# Patient Record
Sex: FEMALE | Race: WHITE | HISPANIC OR LATINO | Employment: UNEMPLOYED | URBAN - METROPOLITAN AREA
[De-identification: names, ages, dates, MRNs, and addresses within clinical notes are randomized per-mention and may not be internally consistent; named-entity substitution may affect disease eponyms.]

---

## 2017-06-09 ENCOUNTER — OFFICE VISIT (OUTPATIENT)
Dept: URGENT CARE | Facility: PHYSICIAN GROUP | Age: 62
End: 2017-06-09
Payer: COMMERCIAL

## 2017-06-09 VITALS
HEART RATE: 128 BPM | BODY MASS INDEX: 31.89 KG/M2 | WEIGHT: 180 LBS | OXYGEN SATURATION: 94 % | SYSTOLIC BLOOD PRESSURE: 140 MMHG | TEMPERATURE: 98.8 F | DIASTOLIC BLOOD PRESSURE: 84 MMHG | HEIGHT: 63 IN

## 2017-06-09 DIAGNOSIS — J02.0 ACUTE STREPTOCOCCAL PHARYNGITIS: ICD-10-CM

## 2017-06-09 LAB
INT CON NEG: NEGATIVE
INT CON POS: POSITIVE
S PYO AG THROAT QL: NORMAL

## 2017-06-09 PROCEDURE — 87880 STREP A ASSAY W/OPTIC: CPT | Performed by: FAMILY MEDICINE

## 2017-06-09 PROCEDURE — 99204 OFFICE O/P NEW MOD 45 MIN: CPT | Performed by: FAMILY MEDICINE

## 2017-06-09 RX ORDER — CEFTRIAXONE 1 G/1
1 INJECTION, POWDER, FOR SOLUTION INTRAMUSCULAR; INTRAVENOUS ONCE
Status: COMPLETED | OUTPATIENT
Start: 2017-06-09 | End: 2017-06-09

## 2017-06-09 RX ORDER — AMOXICILLIN 400 MG/5ML
POWDER, FOR SUSPENSION ORAL
Qty: 150 ML | Refills: 0 | Status: SHIPPED | OUTPATIENT
Start: 2017-06-09 | End: 2022-06-15

## 2017-06-09 RX ADMIN — CEFTRIAXONE 1 G: 1 INJECTION, POWDER, FOR SOLUTION INTRAMUSCULAR; INTRAVENOUS at 17:47

## 2017-06-10 NOTE — PROGRESS NOTES
Chief Complaint:    Chief Complaint   Patient presents with   • Cough     cough, fever        History of Present Illness:    Daughter present and helps translate. This is a new problem. Symptoms x 2 days. Has subjective fever, chills, body aches, right-sided sore throat (hurts to swallow, at least moderate severity), discomfort in right neck, and mild cough. No nasal symptoms. Has been taking Tylenol and Ibuprofen.      Review of Systems:    Constitutional: See HPI.   Eyes: Negative for change in vision, photophobia, pain, redness, and discharge.  ENT: See HPI.  Respiratory: See HPI.    Cardiovascular: Negative for chest pain, palpitations, orthopnea, claudication, leg swelling, and PND.   Gastrointestinal: Negative for abdominal pain, nausea, vomiting, diarrhea, constipation, blood in stool, and melena.   Genitourinary: Negative for dysuria, urinary urgency, urinary frequency, hematuria, and flank pain.   Musculoskeletal: See HPI.    Skin: Negative for rash and itching.   Neurological: Negative for dizziness, tingling, tremors, sensory change, speech change, focal weakness, seizures, loss of consciousness, and headaches.   Endo: Negative for polydipsia.   Heme: Does not bruise/bleed easily.   Psychiatric/Behavioral: Negative for depression, suicidal ideas, hallucinations, memory loss and substance abuse. The patient is not nervous/anxious and does not have insomnia.      Past Medical History:    Past Medical History   Diagnosis Date   • Cancer (CMS-HCC)    • Hypertension    • Psychiatric disorder      anxiety       Past Surgical History:    Past Surgical History   Procedure Laterality Date   • Cassandra by laparoscopy     • Other       tumor to chest       Social History:    Social History     Social History   • Marital Status:      Spouse Name: N/A   • Number of Children: N/A   • Years of Education: N/A     Occupational History   • Not on file.     Social History Main Topics   • Smoking status: Passive Smoke  "Exposure - Never Smoker   • Smokeless tobacco: Not on file   • Alcohol Use: No   • Drug Use: No   • Sexual Activity: Not on file     Other Topics Concern   • Not on file     Social History Narrative       Family History:    History reviewed. No pertinent family history.    Medications:    No current outpatient prescriptions on file prior to visit.     No current facility-administered medications on file prior to visit.       Allergies:    No Known Allergies      Vitals:    Filed Vitals:    06/09/17 1729   BP: 140/84   Pulse: 128   Temp: 37.1 °C (98.8 °F)   Height: 1.6 m (5' 2.99\")   Weight: 81.647 kg (180 lb)   SpO2: 94%       Physical Exam:    Constitutional: Vital signs reviewed. Appears well-developed and well-nourished. No acute distress.   Eyes: Sclera white, conjunctivae clear.   ENT: External ears normal. External auditory canals normal without discharge. TMs translucent and non-bulging. Hearing normal. Nasal mucosa pink. Lips/teeth are normal. Oral mucosa pink and moist. Posterior pharynx and tonsils: Moderately swollen and erythematous with mild exudate on right compared to left.  Neck: Neck supple.   Cardiovascular: Tachycardic. Regular rhythm. No murmur.   Pulmonary/Chest: Respirations non-labored. Clear to auscultation bilaterally.  Lymph: Right anterior cervical nodes are moderately tender and enlarged to palpation compared to left.  Musculoskeletal: Normal gait. Normal range of motion. No muscular atrophy or weakness.  Neurological: Alert and oriented to person, place, and time. Muscle tone normal. Coordination normal. Light touch and sensation normal.   Skin: No rashes or lesions. Warm, dry, normal turgor.  Psychiatric: Normal mood and affect. Behavior is normal. Judgment and thought content normal.     Diagnostics:     POCT RAPID STREP A (Order #[id number]) on 6/9/17       Component Results      Component     Rapid Strep Screen     Pos     Internal Control Positive     Positive     Internal Control " Negative     Negative         Last Resulted Time     Fri Jun 9, 2017  5:52 PM       Assessment / Plan:    1. Acute streptococcal pharyngitis  - POCT Rapid Strep A  - cefTRIAXone (ROCEPHIN) injection 1 g; 1,000 mg by Intramuscular route Once.  - amoxicillin (AMOXIL) 400 MG/5ML suspension; 7 ML TWICE A DAY X 10 DAYS.  Dispense: 150 mL; Refill: 0      Discussed with them DDX and management options.    Desires aggressive antibiotic treatment with IM and p.o. due to severity of symptoms.    Agreeable to medications given and prescribed.    May continue with OTC Tylenol and/or Ibuprofen prn fever, body aches, and/or sore throat.    Follow-up with PCP or urgent care if getting worse or not better with above.

## 2019-10-24 ENCOUNTER — HOSPITAL ENCOUNTER (OUTPATIENT)
Dept: LAB | Facility: MEDICAL CENTER | Age: 64
End: 2019-10-24
Attending: SPECIALIST
Payer: MEDICARE

## 2019-10-24 PROCEDURE — 88175 CYTOPATH C/V AUTO FLUID REDO: CPT

## 2019-10-24 PROCEDURE — 87624 HPV HI-RISK TYP POOLED RSLT: CPT

## 2019-10-29 LAB
AMBIGUOUS DTTM AMBI4: NORMAL
SPECIMEN SOURCE: NORMAL
SPECIMEN SOURCE: NORMAL

## 2019-10-30 LAB
CYTOLOGY REG CYTOL: NORMAL
HPV HR 12 DNA CVX QL NAA+PROBE: NEGATIVE
HPV16 DNA SPEC QL NAA+PROBE: NEGATIVE
HPV18 DNA SPEC QL NAA+PROBE: NEGATIVE
SPECIMEN SOURCE: NORMAL

## 2021-03-03 DIAGNOSIS — Z23 NEED FOR VACCINATION: ICD-10-CM

## 2022-06-15 ENCOUNTER — APPOINTMENT (OUTPATIENT)
Dept: RADIOLOGY | Facility: MEDICAL CENTER | Age: 67
End: 2022-06-15
Attending: EMERGENCY MEDICINE
Payer: MEDICARE

## 2022-06-15 ENCOUNTER — HOSPITAL ENCOUNTER (OUTPATIENT)
Facility: MEDICAL CENTER | Age: 67
End: 2022-06-16
Attending: EMERGENCY MEDICINE | Admitting: STUDENT IN AN ORGANIZED HEALTH CARE EDUCATION/TRAINING PROGRAM
Payer: MEDICARE

## 2022-06-15 DIAGNOSIS — R07.9 ACUTE CHEST PAIN: ICD-10-CM

## 2022-06-15 DIAGNOSIS — R94.31 ABNORMAL EKG: ICD-10-CM

## 2022-06-15 DIAGNOSIS — R07.2 PRECORDIAL PAIN: ICD-10-CM

## 2022-06-15 PROBLEM — E66.9 CLASS 1 OBESITY IN ADULT: Status: ACTIVE | Noted: 2022-06-15

## 2022-06-15 PROBLEM — I10 HYPERTENSION: Status: ACTIVE | Noted: 2022-06-15

## 2022-06-15 PROBLEM — R79.89 ELEVATED D-DIMER: Status: ACTIVE | Noted: 2022-06-15

## 2022-06-15 PROBLEM — K21.9 GERD (GASTROESOPHAGEAL REFLUX DISEASE): Status: ACTIVE | Noted: 2022-06-15

## 2022-06-15 PROBLEM — Z71.89 ACP (ADVANCE CARE PLANNING): Status: ACTIVE | Noted: 2022-06-15

## 2022-06-15 LAB
ALBUMIN SERPL BCP-MCNC: 4.2 G/DL (ref 3.2–4.9)
ALBUMIN/GLOB SERPL: 1.4 G/DL
ALP SERPL-CCNC: 94 U/L (ref 30–99)
ALT SERPL-CCNC: 25 U/L (ref 2–50)
ANION GAP SERPL CALC-SCNC: 10 MMOL/L (ref 7–16)
AST SERPL-CCNC: 21 U/L (ref 12–45)
BASOPHILS # BLD AUTO: 0.7 % (ref 0–1.8)
BASOPHILS # BLD: 0.05 K/UL (ref 0–0.12)
BILIRUB SERPL-MCNC: 0.4 MG/DL (ref 0.1–1.5)
BUN SERPL-MCNC: 17 MG/DL (ref 8–22)
CALCIUM SERPL-MCNC: 9.4 MG/DL (ref 8.5–10.5)
CHLORIDE SERPL-SCNC: 106 MMOL/L (ref 96–112)
CO2 SERPL-SCNC: 24 MMOL/L (ref 20–33)
CREAT SERPL-MCNC: 0.81 MG/DL (ref 0.5–1.4)
D DIMER PPP IA.FEU-MCNC: 0.78 UG/ML (FEU) (ref 0–0.5)
EKG IMPRESSION: NORMAL
EOSINOPHIL # BLD AUTO: 0.18 K/UL (ref 0–0.51)
EOSINOPHIL NFR BLD: 2.5 % (ref 0–6.9)
ERYTHROCYTE [DISTWIDTH] IN BLOOD BY AUTOMATED COUNT: 43.5 FL (ref 35.9–50)
GFR SERPLBLD CREATININE-BSD FMLA CKD-EPI: 79 ML/MIN/1.73 M 2
GLOBULIN SER CALC-MCNC: 3 G/DL (ref 1.9–3.5)
GLUCOSE SERPL-MCNC: 102 MG/DL (ref 65–99)
HCT VFR BLD AUTO: 41.7 % (ref 37–47)
HGB BLD-MCNC: 14 G/DL (ref 12–16)
IMM GRANULOCYTES # BLD AUTO: 0.04 K/UL (ref 0–0.11)
IMM GRANULOCYTES NFR BLD AUTO: 0.6 % (ref 0–0.9)
LYMPHOCYTES # BLD AUTO: 2.56 K/UL (ref 1–4.8)
LYMPHOCYTES NFR BLD: 36 % (ref 22–41)
MCH RBC QN AUTO: 30.1 PG (ref 27–33)
MCHC RBC AUTO-ENTMCNC: 33.6 G/DL (ref 33.6–35)
MCV RBC AUTO: 89.7 FL (ref 81.4–97.8)
MONOCYTES # BLD AUTO: 0.55 K/UL (ref 0–0.85)
MONOCYTES NFR BLD AUTO: 7.7 % (ref 0–13.4)
NEUTROPHILS # BLD AUTO: 3.73 K/UL (ref 2–7.15)
NEUTROPHILS NFR BLD: 52.5 % (ref 44–72)
NRBC # BLD AUTO: 0 K/UL
NRBC BLD-RTO: 0 /100 WBC
NT-PROBNP SERPL IA-MCNC: 290 PG/ML (ref 0–125)
PLATELET # BLD AUTO: 238 K/UL (ref 164–446)
PMV BLD AUTO: 10.2 FL (ref 9–12.9)
POTASSIUM SERPL-SCNC: 3.9 MMOL/L (ref 3.6–5.5)
PROT SERPL-MCNC: 7.2 G/DL (ref 6–8.2)
RBC # BLD AUTO: 4.65 M/UL (ref 4.2–5.4)
SODIUM SERPL-SCNC: 140 MMOL/L (ref 135–145)
TROPONIN T SERPL-MCNC: <6 NG/L (ref 6–19)
WBC # BLD AUTO: 7.1 K/UL (ref 4.8–10.8)

## 2022-06-15 PROCEDURE — 84484 ASSAY OF TROPONIN QUANT: CPT

## 2022-06-15 PROCEDURE — 83880 ASSAY OF NATRIURETIC PEPTIDE: CPT

## 2022-06-15 PROCEDURE — A9270 NON-COVERED ITEM OR SERVICE: HCPCS | Performed by: EMERGENCY MEDICINE

## 2022-06-15 PROCEDURE — 700102 HCHG RX REV CODE 250 W/ 637 OVERRIDE(OP): Performed by: EMERGENCY MEDICINE

## 2022-06-15 PROCEDURE — 99285 EMERGENCY DEPT VISIT HI MDM: CPT

## 2022-06-15 PROCEDURE — 71045 X-RAY EXAM CHEST 1 VIEW: CPT

## 2022-06-15 PROCEDURE — 96376 TX/PRO/DX INJ SAME DRUG ADON: CPT | Mod: XU

## 2022-06-15 PROCEDURE — 700102 HCHG RX REV CODE 250 W/ 637 OVERRIDE(OP): Performed by: STUDENT IN AN ORGANIZED HEALTH CARE EDUCATION/TRAINING PROGRAM

## 2022-06-15 PROCEDURE — 85025 COMPLETE CBC W/AUTO DIFF WBC: CPT

## 2022-06-15 PROCEDURE — G0378 HOSPITAL OBSERVATION PER HR: HCPCS

## 2022-06-15 PROCEDURE — 700111 HCHG RX REV CODE 636 W/ 250 OVERRIDE (IP): Performed by: EMERGENCY MEDICINE

## 2022-06-15 PROCEDURE — 85379 FIBRIN DEGRADATION QUANT: CPT

## 2022-06-15 PROCEDURE — 93005 ELECTROCARDIOGRAM TRACING: CPT | Performed by: EMERGENCY MEDICINE

## 2022-06-15 PROCEDURE — 80053 COMPREHEN METABOLIC PANEL: CPT

## 2022-06-15 PROCEDURE — 93005 ELECTROCARDIOGRAM TRACING: CPT

## 2022-06-15 PROCEDURE — 71275 CT ANGIOGRAPHY CHEST: CPT | Mod: ME

## 2022-06-15 PROCEDURE — 700111 HCHG RX REV CODE 636 W/ 250 OVERRIDE (IP): Performed by: STUDENT IN AN ORGANIZED HEALTH CARE EDUCATION/TRAINING PROGRAM

## 2022-06-15 PROCEDURE — 36415 COLL VENOUS BLD VENIPUNCTURE: CPT

## 2022-06-15 PROCEDURE — 96375 TX/PRO/DX INJ NEW DRUG ADDON: CPT | Mod: XU

## 2022-06-15 PROCEDURE — 700117 HCHG RX CONTRAST REV CODE 255: Performed by: EMERGENCY MEDICINE

## 2022-06-15 PROCEDURE — 96374 THER/PROPH/DIAG INJ IV PUSH: CPT | Mod: XU

## 2022-06-15 PROCEDURE — A9270 NON-COVERED ITEM OR SERVICE: HCPCS | Performed by: STUDENT IN AN ORGANIZED HEALTH CARE EDUCATION/TRAINING PROGRAM

## 2022-06-15 PROCEDURE — 99218 PR INITIAL OBSERVATION CARE,LEVL I: CPT | Performed by: STUDENT IN AN ORGANIZED HEALTH CARE EDUCATION/TRAINING PROGRAM

## 2022-06-15 PROCEDURE — 96372 THER/PROPH/DIAG INJ SC/IM: CPT | Mod: XU

## 2022-06-15 RX ORDER — POLYETHYLENE GLYCOL 3350 17 G/17G
1 POWDER, FOR SOLUTION ORAL
Status: DISCONTINUED | OUTPATIENT
Start: 2022-06-15 | End: 2022-06-17 | Stop reason: HOSPADM

## 2022-06-15 RX ORDER — ENOXAPARIN SODIUM 100 MG/ML
40 INJECTION SUBCUTANEOUS DAILY
Status: DISCONTINUED | OUTPATIENT
Start: 2022-06-15 | End: 2022-06-17 | Stop reason: HOSPADM

## 2022-06-15 RX ORDER — LANSOPRAZOLE 30 MG/1
30 CAPSULE, DELAYED RELEASE ORAL EVERY MORNING
COMMUNITY

## 2022-06-15 RX ORDER — BISOPROLOL FUMARATE 5 MG/1
5 TABLET, FILM COATED ORAL EVERY MORNING
COMMUNITY

## 2022-06-15 RX ORDER — LORAZEPAM 2 MG/ML
0.5 INJECTION INTRAMUSCULAR ONCE
Status: COMPLETED | OUTPATIENT
Start: 2022-06-15 | End: 2022-06-15

## 2022-06-15 RX ORDER — HYDRALAZINE HYDROCHLORIDE 20 MG/ML
10 INJECTION INTRAMUSCULAR; INTRAVENOUS EVERY 4 HOURS PRN
Status: DISCONTINUED | OUTPATIENT
Start: 2022-06-15 | End: 2022-06-17 | Stop reason: HOSPADM

## 2022-06-15 RX ORDER — ONDANSETRON 2 MG/ML
4 INJECTION INTRAMUSCULAR; INTRAVENOUS ONCE
Status: COMPLETED | OUTPATIENT
Start: 2022-06-15 | End: 2022-06-15

## 2022-06-15 RX ORDER — BISACODYL 10 MG
10 SUPPOSITORY, RECTAL RECTAL
Status: DISCONTINUED | OUTPATIENT
Start: 2022-06-15 | End: 2022-06-17 | Stop reason: HOSPADM

## 2022-06-15 RX ORDER — OMEPRAZOLE 20 MG/1
20 CAPSULE, DELAYED RELEASE ORAL DAILY
Status: DISCONTINUED | OUTPATIENT
Start: 2022-06-16 | End: 2022-06-17 | Stop reason: HOSPADM

## 2022-06-15 RX ORDER — LANSOPRAZOLE 30 MG/1
30 CAPSULE, DELAYED RELEASE ORAL EVERY MORNING
Status: DISCONTINUED | OUTPATIENT
Start: 2022-06-16 | End: 2022-06-15

## 2022-06-15 RX ORDER — ATORVASTATIN CALCIUM 40 MG/1
40 TABLET, FILM COATED ORAL EVERY EVENING
Status: DISCONTINUED | OUTPATIENT
Start: 2022-06-15 | End: 2022-06-17 | Stop reason: HOSPADM

## 2022-06-15 RX ORDER — NITROGLYCERIN 0.4 MG/1
0.4 TABLET SUBLINGUAL
Status: DISCONTINUED | OUTPATIENT
Start: 2022-06-15 | End: 2022-06-16

## 2022-06-15 RX ORDER — MORPHINE SULFATE 4 MG/ML
4 INJECTION INTRAVENOUS ONCE
Status: COMPLETED | OUTPATIENT
Start: 2022-06-15 | End: 2022-06-15

## 2022-06-15 RX ORDER — AMLODIPINE BESYLATE 5 MG/1
5 TABLET ORAL EVERY EVENING
Status: DISCONTINUED | OUTPATIENT
Start: 2022-06-15 | End: 2022-06-17 | Stop reason: HOSPADM

## 2022-06-15 RX ORDER — GUAIFENESIN/DEXTROMETHORPHAN 100-10MG/5
10 SYRUP ORAL EVERY 6 HOURS PRN
Status: DISCONTINUED | OUTPATIENT
Start: 2022-06-15 | End: 2022-06-17 | Stop reason: HOSPADM

## 2022-06-15 RX ORDER — AMLODIPINE BESYLATE 10 MG/1
5 TABLET ORAL EVERY EVENING
COMMUNITY

## 2022-06-15 RX ORDER — MORPHINE SULFATE 4 MG/ML
2-4 INJECTION INTRAVENOUS
Status: DISCONTINUED | OUTPATIENT
Start: 2022-06-15 | End: 2022-06-17 | Stop reason: HOSPADM

## 2022-06-15 RX ORDER — ONDANSETRON 4 MG/1
4 TABLET, ORALLY DISINTEGRATING ORAL EVERY 4 HOURS PRN
Status: DISCONTINUED | OUTPATIENT
Start: 2022-06-15 | End: 2022-06-17 | Stop reason: HOSPADM

## 2022-06-15 RX ORDER — ONDANSETRON 2 MG/ML
4 INJECTION INTRAMUSCULAR; INTRAVENOUS EVERY 4 HOURS PRN
Status: DISCONTINUED | OUTPATIENT
Start: 2022-06-15 | End: 2022-06-17 | Stop reason: HOSPADM

## 2022-06-15 RX ORDER — AMOXICILLIN 250 MG
2 CAPSULE ORAL 2 TIMES DAILY
Status: DISCONTINUED | OUTPATIENT
Start: 2022-06-15 | End: 2022-06-17 | Stop reason: HOSPADM

## 2022-06-15 RX ORDER — ACETAMINOPHEN 500 MG
1000 TABLET ORAL EVERY 6 HOURS PRN
COMMUNITY

## 2022-06-15 RX ORDER — ACETAMINOPHEN 325 MG/1
650 TABLET ORAL EVERY 6 HOURS PRN
Status: DISCONTINUED | OUTPATIENT
Start: 2022-06-15 | End: 2022-06-17 | Stop reason: HOSPADM

## 2022-06-15 RX ADMIN — ONDANSETRON HYDROCHLORIDE 4 MG: 2 SOLUTION INTRAMUSCULAR; INTRAVENOUS at 16:36

## 2022-06-15 RX ADMIN — LORAZEPAM 0.5 MG: 2 INJECTION INTRAMUSCULAR; INTRAVENOUS at 20:18

## 2022-06-15 RX ADMIN — DOCUSATE SODIUM 50 MG AND SENNOSIDES 8.6 MG 2 TABLET: 8.6; 5 TABLET, FILM COATED ORAL at 20:00

## 2022-06-15 RX ADMIN — AMLODIPINE BESYLATE 5 MG: 5 TABLET ORAL at 20:00

## 2022-06-15 RX ADMIN — IOHEXOL 40 ML: 350 INJECTION, SOLUTION INTRAVENOUS at 21:20

## 2022-06-15 RX ADMIN — ENOXAPARIN SODIUM 40 MG: 40 INJECTION SUBCUTANEOUS at 19:59

## 2022-06-15 RX ADMIN — LIDOCAINE HYDROCHLORIDE 30 ML: 20 SOLUTION OROPHARYNGEAL at 16:36

## 2022-06-15 RX ADMIN — ASPIRIN 324 MG: 81 TABLET, COATED ORAL at 19:59

## 2022-06-15 RX ADMIN — MORPHINE SULFATE 4 MG: 4 INJECTION INTRAVENOUS at 16:37

## 2022-06-15 RX ADMIN — MORPHINE SULFATE 2 MG: 4 INJECTION INTRAVENOUS at 21:54

## 2022-06-15 RX ADMIN — ATORVASTATIN CALCIUM 40 MG: 40 TABLET, FILM COATED ORAL at 20:00

## 2022-06-15 ASSESSMENT — PATIENT HEALTH QUESTIONNAIRE - PHQ9
2. FEELING DOWN, DEPRESSED, IRRITABLE, OR HOPELESS: SEVERAL DAYS
4. FEELING TIRED OR HAVING LITTLE ENERGY: NOT AT ALL
7. TROUBLE CONCENTRATING ON THINGS, SUCH AS READING THE NEWSPAPER OR WATCHING TELEVISION: NOT AT ALL
5. POOR APPETITE OR OVEREATING: NOT AT ALL
SUM OF ALL RESPONSES TO PHQ9 QUESTIONS 1 AND 2: 2
SUM OF ALL RESPONSES TO PHQ QUESTIONS 1-9: 2
8. MOVING OR SPEAKING SO SLOWLY THAT OTHER PEOPLE COULD HAVE NOTICED. OR THE OPPOSITE, BEING SO FIGETY OR RESTLESS THAT YOU HAVE BEEN MOVING AROUND A LOT MORE THAN USUAL: NOT AT ALL
1. LITTLE INTEREST OR PLEASURE IN DOING THINGS: SEVERAL DAYS
9. THOUGHTS THAT YOU WOULD BE BETTER OFF DEAD, OR OF HURTING YOURSELF: NOT AT ALL
6. FEELING BAD ABOUT YOURSELF - OR THAT YOU ARE A FAILURE OR HAVE LET YOURSELF OR YOUR FAMILY DOWN: NOT AL ALL
3. TROUBLE FALLING OR STAYING ASLEEP OR SLEEPING TOO MUCH: NOT AT ALL

## 2022-06-15 ASSESSMENT — ENCOUNTER SYMPTOMS
DEPRESSION: 0
FEVER: 0
BLURRED VISION: 0
COUGH: 0
NAUSEA: 0
DOUBLE VISION: 0
SPEECH CHANGE: 0
VOMITING: 0
HEARTBURN: 0
HEADACHES: 0
ABDOMINAL PAIN: 0
FLANK PAIN: 0
DIZZINESS: 0
CHILLS: 0
MYALGIAS: 1
PALPITATIONS: 0
BRUISES/BLEEDS EASILY: 0
ORTHOPNEA: 0
SHORTNESS OF BREATH: 0
FOCAL WEAKNESS: 0

## 2022-06-15 ASSESSMENT — PAIN DESCRIPTION - PAIN TYPE: TYPE: ACUTE PAIN

## 2022-06-15 ASSESSMENT — LIFESTYLE VARIABLES
ON A TYPICAL DAY WHEN YOU DRINK ALCOHOL HOW MANY DRINKS DO YOU HAVE: 0
TOTAL SCORE: 0
DO YOU DRINK ALCOHOL: NO
CONSUMPTION TOTAL: NEGATIVE
AVERAGE NUMBER OF DAYS PER WEEK YOU HAVE A DRINK CONTAINING ALCOHOL: 0
TOTAL SCORE: 0
EVER FELT BAD OR GUILTY ABOUT YOUR DRINKING: NO
HOW MANY TIMES IN THE PAST YEAR HAVE YOU HAD 5 OR MORE DRINKS IN A DAY: 0
SUBSTANCE_ABUSE: 0
TOTAL SCORE: 0
EVER HAD A DRINK FIRST THING IN THE MORNING TO STEADY YOUR NERVES TO GET RID OF A HANGOVER: NO
HAVE PEOPLE ANNOYED YOU BY CRITICIZING YOUR DRINKING: NO
HAVE YOU EVER FELT YOU SHOULD CUT DOWN ON YOUR DRINKING: NO
ALCOHOL_USE: NO
DOES PATIENT WANT TO STOP DRINKING: NO

## 2022-06-15 ASSESSMENT — FIBROSIS 4 INDEX: FIB4 SCORE: 1.2

## 2022-06-15 NOTE — ED TRIAGE NOTES
"Chief Complaint   Patient presents with   • Shoulder Pain     Right shoulder pain   • Chest Pain     Right lung    sent from , denies injury.  Pain is worse when taking a deep breath. Pain is sharp in nature.   BP (!) 143/67   Pulse 68   Temp 36.8 °C (98.3 °F) (Temporal)   Resp 18   Ht 1.651 m (5' 5\")   Wt 89.5 kg (197 lb 5 oz)   SpO2 96%   Pt informed of wait times. Educated on triage process.  Asked to return to triage RN for any new or worsening of symptoms. Thanked for patience.        "

## 2022-06-15 NOTE — ED NOTES
Patient presents complaining of right chest and shoulder pain. MD Hockley at bedside. IV placed and labs sent

## 2022-06-15 NOTE — ED PROVIDER NOTES
ED Provider Note    CHIEF COMPLAINT  Chief Complaint   Patient presents with   • Shoulder Pain     Right shoulder pain   • Chest Pain     Right lung        HPI  HPI     67-year-old female with past medical history hypertension and GERD presents with right-sided chest pain and shoulder pain.  On Monday she reported having right-sided chest pain radiating to her shoulder or neck.  Patient denies any recent trauma or fevers.  Patient denies any recent change in medications.    Patient describes pain as worsening with deep inspiration.  Patient describes pain as sharp in nature.      REVIEW OF SYSTEMS  Review of Systems   Constitutional: Negative.  Negative for fever.   HENT: Negative.  Negative for ear pain and sore throat.    Eyes: Negative.  Negative for pain.   Respiratory: Negative.  Negative for shortness of breath.    Cardiovascular: Positive for chest pain.   Gastrointestinal: Negative.  Negative for abdominal pain and blood in stool.   Genitourinary: Negative for dysuria and flank pain.   Musculoskeletal: Negative for back pain, myalgias and neck pain.   Skin: Negative.  Negative for rash.   Neurological: Negative for focal weakness, seizures, weakness and headaches.   Endo/Heme/Allergies: Does not bruise/bleed easily.   Psychiatric/Behavioral: Negative for hallucinations and suicidal ideas.   All other systems reviewed and are negative.      PAST MEDICAL HISTORY   has a past medical history of Cancer (HCC), Hypertension, and Psychiatric disorder.    SOCIAL HISTORY  Social History     Tobacco Use   • Smoking status: Passive Smoke Exposure - Never Smoker   • Smokeless tobacco: Not on file   Substance and Sexual Activity   • Alcohol use: No   • Drug use: No   • Sexual activity: Not on file       SURGICAL HISTORY   has a past surgical history that includes erna by laparoscopy and other.    CURRENT MEDICATIONS  Home Medications     Reviewed by En Smith (Pharmacy Tech) on 06/15/22 at 2208  [facility] List  "Status: Complete   Medication Last Dose Status   acetaminophen (TYLENOL) 500 MG Tab 6/14/2022 Active   amLODIPine (NORVASC) 10 MG Tab 6/14/2022 Active   bisoprolol (ZEBETA) 5 MG Tab 6/15/2022 Active   lansoprazole (PREVACID) 30 MG CAPSULE DELAYED RELEASE 6/15/2022 Active   Non Formulary Request 6/14/2022 Active                ALLERGIES  No Known Allergies    PHYSICAL EXAM  VITAL SIGNS: /57   Pulse 79   Temp 36.2 °C (97.1 °F) (Temporal)   Resp 16   Ht 1.651 m (5' 5\")   Wt 83.6 kg (184 lb 4.9 oz)   SpO2 93%   BMI 30.67 kg/m²  @RHONDA[[id number]::@  Pulse ox interpretation: I interpret this pulse ox as normal.    Physical Exam  HENT:      Head: Normocephalic and atraumatic.      Right Ear: External ear normal.      Left Ear: External ear normal.   Eyes:      General: No scleral icterus.     Conjunctiva/sclera: Conjunctivae normal.   Cardiovascular:      Rate and Rhythm: Normal rate.   Pulmonary:      Effort: Pulmonary effort is normal. No respiratory distress.      Breath sounds: No stridor. No wheezing.   Abdominal:      General: There is no distension.      Tenderness: There is no abdominal tenderness.   Musculoskeletal:         General: No deformity. Normal range of motion.      Cervical back: Normal range of motion.   Skin:     General: Skin is warm and dry.      Findings: No erythema or rash.   Neurological:      Mental Status: She is alert and oriented to person, place, and time.      Coordination: Coordination normal.   Psychiatric:         Mood and Affect: Affect normal.         Judgment: Judgment normal.     2+ peripheral pulses and no lower extremity edema    DIAGNOSTIC STUDIES / PROCEDURES    LABS/EKG  Results for orders placed or performed during the hospital encounter of 06/15/22   CBC with Differential   Result Value Ref Range    WBC 7.1 4.8 - 10.8 K/uL    RBC 4.65 4.20 - 5.40 M/uL    Hemoglobin 14.0 12.0 - 16.0 g/dL    Hematocrit 41.7 37.0 - 47.0 %    MCV 89.7 81.4 - 97.8 fL    MCH 30.1 27.0 - 33.0 " pg    MCHC 33.6 33.6 - 35.0 g/dL    RDW 43.5 35.9 - 50.0 fL    Platelet Count 238 164 - 446 K/uL    MPV 10.2 9.0 - 12.9 fL    Neutrophils-Polys 52.50 44.00 - 72.00 %    Lymphocytes 36.00 22.00 - 41.00 %    Monocytes 7.70 0.00 - 13.40 %    Eosinophils 2.50 0.00 - 6.90 %    Basophils 0.70 0.00 - 1.80 %    Immature Granulocytes 0.60 0.00 - 0.90 %    Nucleated RBC 0.00 /100 WBC    Neutrophils (Absolute) 3.73 2.00 - 7.15 K/uL    Lymphs (Absolute) 2.56 1.00 - 4.80 K/uL    Monos (Absolute) 0.55 0.00 - 0.85 K/uL    Eos (Absolute) 0.18 0.00 - 0.51 K/uL    Baso (Absolute) 0.05 0.00 - 0.12 K/uL    Immature Granulocytes (abs) 0.04 0.00 - 0.11 K/uL    NRBC (Absolute) 0.00 K/uL   Complete Metabolic Panel (CMP)   Result Value Ref Range    Sodium 140 135 - 145 mmol/L    Potassium 3.9 3.6 - 5.5 mmol/L    Chloride 106 96 - 112 mmol/L    Co2 24 20 - 33 mmol/L    Anion Gap 10.0 7.0 - 16.0    Glucose 102 (H) 65 - 99 mg/dL    Bun 17 8 - 22 mg/dL    Creatinine 0.81 0.50 - 1.40 mg/dL    Calcium 9.4 8.5 - 10.5 mg/dL    AST(SGOT) 21 12 - 45 U/L    ALT(SGPT) 25 2 - 50 U/L    Alkaline Phosphatase 94 30 - 99 U/L    Total Bilirubin 0.4 0.1 - 1.5 mg/dL    Albumin 4.2 3.2 - 4.9 g/dL    Total Protein 7.2 6.0 - 8.2 g/dL    Globulin 3.0 1.9 - 3.5 g/dL    A-G Ratio 1.4 g/dL   Troponin   Result Value Ref Range    Troponin T <6 6 - 19 ng/L   ESTIMATED GFR   Result Value Ref Range    GFR (CKD-EPI) 79 >60 mL/min/1.73 m 2   D-DIMER   Result Value Ref Range    D-Dimer Screen 0.78 (H) 0.00 - 0.50 ug/mL (FEU)   TROPONIN   Result Value Ref Range    Troponin T <6 6 - 19 ng/L   TROPONIN   Result Value Ref Range    Troponin T <6 6 - 19 ng/L   proBrain Natriuretic Peptide, NT   Result Value Ref Range    NT-proBNP 290 (H) 0 - 125 pg/mL   EKG   Result Value Ref Range    Report       [facility]    Test Date:  2022-06-15  Pt Name:    KATIUSKA GASPAR          Department: ER  MRN:        [id number]                      Room:  Gender:      Female                       Technician: [id number]  :        [id number]                   Requested By:ER TRIAGE PROTOCOL  Order #:    [id number]                    Reading MD:    Measurements  Intervals                                Axis  Rate:       74                           P:          60  MN:         148                          QRS:        -52  QRSD:       130                          T:          -3  QT:         424  QTc:        471    Interpretive Statements  SINUS RHYTHM  RBBB AND LAFB  Compared to ECG 2010 10:23:28  Left anterior fascicular block now present  Right bundle-branch block now present  Sinus tachycardia no longer present  Myocardial infarct finding no longer present         RADIOLOGY  CT-CTA CHEST PULMONARY ARTERY W/ RECONS   Final Result         1.  No evidence of pulmonary embolism.   2.  Atherosclerosis.      Fleischner Society pulmonary nodule recommendations:   Nonapplicable      DX-CHEST-PORTABLE (1 VIEW)   Final Result      1.  There is no acute cardiopulmonary process.   2.  Hypoinflated exam similar to prior exam.      EC-ECHOCARDIOGRAM COMPLETE W/O CONT    (Results Pending)   NM-CARDIAC STRESS TEST    (Results Pending)        COURSE & MEDICAL DECISION MAKING  Pertinent Labs & Imaging studies reviewed by me. (See chart for details)  I verified that the patient was wearing a mask and I was wearing appropriate PPE every time I entered the room. The patient's mask was on the patient at all times during my encounter except for a brief view of the oropharynx.     67 y.o. female  p/w chest pain.     Differential diagnosis includes but is not limited to:    Heart score 4  EKG with new right bundle branch block and LAFB  4:54 PM D/w cardiology re:ekg plan for repeat trop and admission given heart score of 4  D-dimer noted to be elevated therefore CT PE ordered by me with no evidence of pulmonary embolism  Plan for admission and stress test  Patient admitted to Dr. Bryant in guarded  condition      FINAL IMPRESSION  Visit Diagnoses     ICD-10-CM   1. Abnormal EKG  R94.31   2. Acute chest pain  R07.9              Electronically signed by: Giorgi Lopez M.D., 6/15/2022 4:53 PM

## 2022-06-16 ENCOUNTER — APPOINTMENT (OUTPATIENT)
Dept: CARDIOLOGY | Facility: MEDICAL CENTER | Age: 67
End: 2022-06-16
Attending: PHYSICIAN ASSISTANT
Payer: MEDICARE

## 2022-06-16 ENCOUNTER — APPOINTMENT (OUTPATIENT)
Dept: CARDIOLOGY | Facility: MEDICAL CENTER | Age: 67
End: 2022-06-16
Attending: STUDENT IN AN ORGANIZED HEALTH CARE EDUCATION/TRAINING PROGRAM
Payer: MEDICARE

## 2022-06-16 ENCOUNTER — APPOINTMENT (OUTPATIENT)
Dept: RADIOLOGY | Facility: MEDICAL CENTER | Age: 67
End: 2022-06-16
Attending: STUDENT IN AN ORGANIZED HEALTH CARE EDUCATION/TRAINING PROGRAM
Payer: MEDICARE

## 2022-06-16 VITALS
BODY MASS INDEX: 30.71 KG/M2 | HEIGHT: 65 IN | RESPIRATION RATE: 16 BRPM | OXYGEN SATURATION: 91 % | HEART RATE: 73 BPM | SYSTOLIC BLOOD PRESSURE: 128 MMHG | WEIGHT: 184.3 LBS | TEMPERATURE: 97.3 F | DIASTOLIC BLOOD PRESSURE: 61 MMHG

## 2022-06-16 PROBLEM — R07.9 CHEST PAIN: Status: RESOLVED | Noted: 2022-06-15 | Resolved: 2022-06-16

## 2022-06-16 PROBLEM — R79.89 ELEVATED D-DIMER: Status: RESOLVED | Noted: 2022-06-15 | Resolved: 2022-06-16

## 2022-06-16 LAB
ALBUMIN SERPL BCP-MCNC: 3.7 G/DL (ref 3.2–4.9)
BASOPHILS # BLD AUTO: 0.7 % (ref 0–1.8)
BASOPHILS # BLD: 0.05 K/UL (ref 0–0.12)
BUN SERPL-MCNC: 18 MG/DL (ref 8–22)
CALCIUM SERPL-MCNC: 9.1 MG/DL (ref 8.5–10.5)
CHLORIDE SERPL-SCNC: 105 MMOL/L (ref 96–112)
CHOLEST SERPL-MCNC: 158 MG/DL (ref 100–199)
CO2 SERPL-SCNC: 26 MMOL/L (ref 20–33)
CREAT SERPL-MCNC: 0.76 MG/DL (ref 0.5–1.4)
EKG IMPRESSION: NORMAL
EOSINOPHIL # BLD AUTO: 0.23 K/UL (ref 0–0.51)
EOSINOPHIL NFR BLD: 3.1 % (ref 0–6.9)
ERYTHROCYTE [DISTWIDTH] IN BLOOD BY AUTOMATED COUNT: 45.1 FL (ref 35.9–50)
EST. AVERAGE GLUCOSE BLD GHB EST-MCNC: 111 MG/DL
GFR SERPLBLD CREATININE-BSD FMLA CKD-EPI: 85 ML/MIN/1.73 M 2
GLUCOSE SERPL-MCNC: 98 MG/DL (ref 65–99)
HBA1C MFR BLD: 5.5 % (ref 4–5.6)
HCT VFR BLD AUTO: 39.6 % (ref 37–47)
HDLC SERPL-MCNC: 37 MG/DL
HGB BLD-MCNC: 13.1 G/DL (ref 12–16)
IMM GRANULOCYTES # BLD AUTO: 0.04 K/UL (ref 0–0.11)
IMM GRANULOCYTES NFR BLD AUTO: 0.5 % (ref 0–0.9)
LDLC SERPL CALC-MCNC: 58 MG/DL
LV EJECT FRACT  99904: 60
LV EJECT FRACT MOD 2C 99903: 67.34
LV EJECT FRACT MOD 4C 99902: 76.38
LV EJECT FRACT MOD BP 99901: 71.24
LYMPHOCYTES # BLD AUTO: 3.11 K/UL (ref 1–4.8)
LYMPHOCYTES NFR BLD: 42 % (ref 22–41)
MAGNESIUM SERPL-MCNC: 2.2 MG/DL (ref 1.5–2.5)
MCH RBC QN AUTO: 30.2 PG (ref 27–33)
MCHC RBC AUTO-ENTMCNC: 33.1 G/DL (ref 33.6–35)
MCV RBC AUTO: 91.2 FL (ref 81.4–97.8)
MONOCYTES # BLD AUTO: 0.51 K/UL (ref 0–0.85)
MONOCYTES NFR BLD AUTO: 6.9 % (ref 0–13.4)
NEUTROPHILS # BLD AUTO: 3.47 K/UL (ref 2–7.15)
NEUTROPHILS NFR BLD: 46.8 % (ref 44–72)
NRBC # BLD AUTO: 0 K/UL
NRBC BLD-RTO: 0 /100 WBC
PHOSPHATE SERPL-MCNC: 3.8 MG/DL (ref 2.5–4.5)
PLATELET # BLD AUTO: 213 K/UL (ref 164–446)
PMV BLD AUTO: 10.7 FL (ref 9–12.9)
POTASSIUM SERPL-SCNC: 3.8 MMOL/L (ref 3.6–5.5)
RBC # BLD AUTO: 4.34 M/UL (ref 4.2–5.4)
SODIUM SERPL-SCNC: 140 MMOL/L (ref 135–145)
TRIGL SERPL-MCNC: 313 MG/DL (ref 0–149)
TROPONIN T SERPL-MCNC: <6 NG/L (ref 6–19)
WBC # BLD AUTO: 7.4 K/UL (ref 4.8–10.8)

## 2022-06-16 PROCEDURE — 700111 HCHG RX REV CODE 636 W/ 250 OVERRIDE (IP)

## 2022-06-16 PROCEDURE — 700102 HCHG RX REV CODE 250 W/ 637 OVERRIDE(OP): Performed by: PHYSICIAN ASSISTANT

## 2022-06-16 PROCEDURE — 93306 TTE W/DOPPLER COMPLETE: CPT | Mod: 26 | Performed by: INTERNAL MEDICINE

## 2022-06-16 PROCEDURE — 80061 LIPID PANEL: CPT

## 2022-06-16 PROCEDURE — 99152 MOD SED SAME PHYS/QHP 5/>YRS: CPT | Performed by: INTERNAL MEDICINE

## 2022-06-16 PROCEDURE — 93458 L HRT ARTERY/VENTRICLE ANGIO: CPT | Mod: 26 | Performed by: INTERNAL MEDICINE

## 2022-06-16 PROCEDURE — 99217 PR OBSERVATION CARE DISCHARGE: CPT | Performed by: HOSPITALIST

## 2022-06-16 PROCEDURE — 93306 TTE W/DOPPLER COMPLETE: CPT

## 2022-06-16 PROCEDURE — A9270 NON-COVERED ITEM OR SERVICE: HCPCS | Performed by: STUDENT IN AN ORGANIZED HEALTH CARE EDUCATION/TRAINING PROGRAM

## 2022-06-16 PROCEDURE — 93010 ELECTROCARDIOGRAM REPORT: CPT | Performed by: INTERNAL MEDICINE

## 2022-06-16 PROCEDURE — A9270 NON-COVERED ITEM OR SERVICE: HCPCS | Performed by: PHYSICIAN ASSISTANT

## 2022-06-16 PROCEDURE — 80069 RENAL FUNCTION PANEL: CPT

## 2022-06-16 PROCEDURE — 83036 HEMOGLOBIN GLYCOSYLATED A1C: CPT

## 2022-06-16 PROCEDURE — 700105 HCHG RX REV CODE 258: Performed by: HOSPITALIST

## 2022-06-16 PROCEDURE — 84484 ASSAY OF TROPONIN QUANT: CPT

## 2022-06-16 PROCEDURE — 700117 HCHG RX CONTRAST REV CODE 255: Performed by: INTERNAL MEDICINE

## 2022-06-16 PROCEDURE — G0378 HOSPITAL OBSERVATION PER HR: HCPCS

## 2022-06-16 PROCEDURE — A9502 TC99M TETROFOSMIN: HCPCS

## 2022-06-16 PROCEDURE — 85025 COMPLETE CBC W/AUTO DIFF WBC: CPT

## 2022-06-16 PROCEDURE — 700101 HCHG RX REV CODE 250

## 2022-06-16 PROCEDURE — 93458 L HRT ARTERY/VENTRICLE ANGIO: CPT

## 2022-06-16 PROCEDURE — 93005 ELECTROCARDIOGRAM TRACING: CPT | Performed by: STUDENT IN AN ORGANIZED HEALTH CARE EDUCATION/TRAINING PROGRAM

## 2022-06-16 PROCEDURE — 83735 ASSAY OF MAGNESIUM: CPT

## 2022-06-16 PROCEDURE — 700102 HCHG RX REV CODE 250 W/ 637 OVERRIDE(OP): Performed by: STUDENT IN AN ORGANIZED HEALTH CARE EDUCATION/TRAINING PROGRAM

## 2022-06-16 RX ORDER — NITROGLYCERIN 0.4 MG/1
0.4 TABLET SUBLINGUAL
Status: DISCONTINUED | OUTPATIENT
Start: 2022-06-16 | End: 2022-06-17 | Stop reason: HOSPADM

## 2022-06-16 RX ORDER — SODIUM CHLORIDE 9 MG/ML
INJECTION, SOLUTION INTRAVENOUS CONTINUOUS
Status: DISCONTINUED | OUTPATIENT
Start: 2022-06-16 | End: 2022-06-17 | Stop reason: HOSPADM

## 2022-06-16 RX ORDER — BISOPROLOL FUMARATE 5 MG/1
5 TABLET, FILM COATED ORAL
Status: DISCONTINUED | OUTPATIENT
Start: 2022-06-16 | End: 2022-06-17 | Stop reason: HOSPADM

## 2022-06-16 RX ORDER — REGADENOSON 0.08 MG/ML
INJECTION, SOLUTION INTRAVENOUS
Status: COMPLETED
Start: 2022-06-16 | End: 2022-06-16

## 2022-06-16 RX ORDER — ASPIRIN 81 MG/1
81 TABLET ORAL DAILY
Qty: 30 TABLET | Refills: 3 | Status: SHIPPED | OUTPATIENT
Start: 2022-06-17

## 2022-06-16 RX ORDER — HEPARIN SODIUM 200 [USP'U]/100ML
INJECTION, SOLUTION INTRAVENOUS
Status: COMPLETED
Start: 2022-06-16 | End: 2022-06-16

## 2022-06-16 RX ORDER — NITROGLYCERIN 0.4 MG/1
0.4 TABLET SUBLINGUAL
Status: DISCONTINUED | OUTPATIENT
Start: 2022-06-16 | End: 2022-06-16

## 2022-06-16 RX ORDER — LIDOCAINE HYDROCHLORIDE 20 MG/ML
INJECTION, SOLUTION INFILTRATION; PERINEURAL
Status: COMPLETED
Start: 2022-06-16 | End: 2022-06-16

## 2022-06-16 RX ORDER — VERAPAMIL HYDROCHLORIDE 2.5 MG/ML
INJECTION, SOLUTION INTRAVENOUS
Status: COMPLETED
Start: 2022-06-16 | End: 2022-06-16

## 2022-06-16 RX ORDER — ATORVASTATIN CALCIUM 40 MG/1
40 TABLET, FILM COATED ORAL EVERY EVENING
Qty: 30 TABLET | Refills: 3 | Status: SHIPPED | OUTPATIENT
Start: 2022-06-17

## 2022-06-16 RX ORDER — REGADENOSON 0.08 MG/ML
0.4 INJECTION, SOLUTION INTRAVENOUS ONCE
Status: COMPLETED | OUTPATIENT
Start: 2022-06-16 | End: 2022-06-16

## 2022-06-16 RX ORDER — HEPARIN SODIUM 1000 [USP'U]/ML
INJECTION, SOLUTION INTRAVENOUS; SUBCUTANEOUS
Status: COMPLETED
Start: 2022-06-16 | End: 2022-06-16

## 2022-06-16 RX ORDER — MIDAZOLAM HYDROCHLORIDE 1 MG/ML
INJECTION INTRAMUSCULAR; INTRAVENOUS
Status: COMPLETED
Start: 2022-06-16 | End: 2022-06-16

## 2022-06-16 RX ORDER — SODIUM CHLORIDE 9 MG/ML
INJECTION, SOLUTION INTRAVENOUS CONTINUOUS
Status: CANCELLED | OUTPATIENT
Start: 2022-06-16 | End: 2022-06-20

## 2022-06-16 RX ORDER — AMINOPHYLLINE 25 MG/ML
100 INJECTION, SOLUTION INTRAVENOUS
Status: DISCONTINUED | OUTPATIENT
Start: 2022-06-16 | End: 2022-06-17 | Stop reason: HOSPADM

## 2022-06-16 RX ADMIN — FENTANYL CITRATE 25 MCG: 50 INJECTION, SOLUTION INTRAMUSCULAR; INTRAVENOUS at 17:18

## 2022-06-16 RX ADMIN — BISOPROLOL FUMARATE 5 MG: 5 TABLET, FILM COATED ORAL at 17:41

## 2022-06-16 RX ADMIN — ATORVASTATIN CALCIUM 40 MG: 40 TABLET, FILM COATED ORAL at 17:40

## 2022-06-16 RX ADMIN — SODIUM CHLORIDE: 9 INJECTION, SOLUTION INTRAVENOUS at 13:07

## 2022-06-16 RX ADMIN — ACETAMINOPHEN 650 MG: 325 TABLET ORAL at 05:05

## 2022-06-16 RX ADMIN — MIDAZOLAM HYDROCHLORIDE 0.5 MG: 1 INJECTION, SOLUTION INTRAMUSCULAR; INTRAVENOUS at 17:18

## 2022-06-16 RX ADMIN — REGADENOSON 0.4 MG: 0.08 INJECTION, SOLUTION INTRAVENOUS at 08:50

## 2022-06-16 RX ADMIN — DOCUSATE SODIUM 50 MG AND SENNOSIDES 8.6 MG 2 TABLET: 8.6; 5 TABLET, FILM COATED ORAL at 05:04

## 2022-06-16 RX ADMIN — VERAPAMIL HYDROCHLORIDE 2.5 MG: 2.5 INJECTION, SOLUTION INTRAVENOUS at 17:15

## 2022-06-16 RX ADMIN — LIDOCAINE HYDROCHLORIDE: 20 INJECTION, SOLUTION INFILTRATION; PERINEURAL at 17:15

## 2022-06-16 RX ADMIN — HEPARIN SODIUM 2000 UNITS: 200 INJECTION, SOLUTION INTRAVENOUS at 17:15

## 2022-06-16 RX ADMIN — NITROGLYCERIN 10 ML: 20 INJECTION INTRAVENOUS at 17:15

## 2022-06-16 RX ADMIN — OMEPRAZOLE 20 MG: 20 CAPSULE, DELAYED RELEASE ORAL at 05:04

## 2022-06-16 RX ADMIN — AMLODIPINE BESYLATE 5 MG: 5 TABLET ORAL at 17:40

## 2022-06-16 RX ADMIN — ASPIRIN 81 MG: 81 TABLET, COATED ORAL at 05:04

## 2022-06-16 RX ADMIN — HEPARIN SODIUM: 1000 INJECTION, SOLUTION INTRAVENOUS; SUBCUTANEOUS at 17:16

## 2022-06-16 RX ADMIN — IOHEXOL 20 ML: 350 INJECTION, SOLUTION INTRAVENOUS at 17:17

## 2022-06-16 ASSESSMENT — ENCOUNTER SYMPTOMS
CONSTIPATION: 0
BACK PAIN: 0
PALPITATIONS: 0
EYE PAIN: 0
RESPIRATORY NEGATIVE: 1
SHORTNESS OF BREATH: 0
ABDOMINAL PAIN: 0
SEIZURES: 0
NAUSEA: 0
FOCAL WEAKNESS: 0
SORE THROAT: 0
HALLUCINATIONS: 0
FLANK PAIN: 0
CHEST TIGHTNESS: 0
RHINORRHEA: 0
ALLERGIC/IMMUNOLOGIC NEGATIVE: 1
NEUROLOGICAL NEGATIVE: 1
BRUISES/BLEEDS EASILY: 0
ENDOCRINE NEGATIVE: 1
CONSTITUTIONAL NEGATIVE: 1
GASTROINTESTINAL NEGATIVE: 1
FATIGUE: 0
DIZZINESS: 0
FEVER: 0
WEAKNESS: 0
NECK PAIN: 0
NUMBNESS: 0
UNEXPECTED WEIGHT CHANGE: 0
BLOOD IN STOOL: 0
LIGHT-HEADEDNESS: 0
ARTHRALGIAS: 1
MYALGIAS: 0
APNEA: 0
ROS GI COMMENTS: BLOATING
DIARRHEA: 0
HEMATOLOGIC/LYMPHATIC NEGATIVE: 1
DIAPHORESIS: 0
COUGH: 0
PSYCHIATRIC NEGATIVE: 1
JOINT SWELLING: 0
EYES NEGATIVE: 1
HEADACHES: 0

## 2022-06-16 NOTE — PROGRESS NOTES
1945 Picked up pt from ER on tele.   2116 Pt transported to CT.  2130 Pt return from CT.  2140 Pt stating she has blurred vision, more so in the left eye, which is new today. Admission questionnaire completed. Pt c/o pain only with movement 8/10 in left arm.   2154 Morphine given for pain. Assessment completed, see flowsheet.   2255 Hospitalist notified of the blurry vision.  0000 Pt placed on 1L NC d/t hypoxia.   0200 Pt asleep in bed, daughter at bedside.   0500 Pt up to bathroom, medications given.   0715 Report given to oncoming RN.

## 2022-06-16 NOTE — ED NOTES
Med rec completed per patient with daughters at bedside. Daughters translated for patient.  Allergies reviewed with patient and daughters. NKDA.  No outpatient antibiotics in the last 30 days.  Patient's preferred pharmacy: [facility] in [place].    Patient's medications are from [facility] and some of her medications are nonformulary in the [place] i.e. rupatadine.

## 2022-06-16 NOTE — PROGRESS NOTES
Pt transported back to room from stress test. Pt on tele monitor, vitals stable. Pt ambulating to restroom. Pt placed on cardiac diet and tray given. Daughter at bedside.

## 2022-06-16 NOTE — PROGRESS NOTES
4 Eyes Skin Assessment Completed by ANDREI cam and ANDREI london.    Head WDL  Ears WDL  Nose WDL  Mouth WDL  Neck WDL  Breast/Chest WDL  Shoulder Blades WDL  Spine WDL  (R) Arm/Elbow/Hand WDL  (L) Arm/Elbow/Hand WDL  Abdomen WDL  Groin WDL  Scrotum/Coccyx/Buttocks WDL  (R) Leg WDL  (L) Leg WDL  (R) Heel/Foot/Toe WDL  (L) Heel/Foot/Toe WDL          Devices In Places Pulse Ox      Interventions In Place N/A    Possible Skin Injury No    Pictures Uploaded Into Epic N/A  Wound Consult Placed N/A  RN Wound Prevention Protocol Ordered No

## 2022-06-16 NOTE — H&P
Hospital Medicine History & Physical Note    Date of Service  6/15/2022    Primary Care Physician  Pcp Pt States None    Consultants  Cardiology (Dr. Stratton)    Code Status  Full Code    Chief Complaint  Chief Complaint   Patient presents with   • Shoulder Pain     Right shoulder pain   • Chest Pain     Right lung        History of Presenting Illness  Alina Hampton is a 67 y.o. Indian-speaking female with history of hypertension, GERD who presented 6/15/2022 with right-sided chest pain and shoulder pain.  History obtained from patient's 2 daughters who are at bedside in ER as patient is Indian-speaking only.  Per daughter, patient is visiting from [facility].  On Monday 6/13, she reported having right-sided, sharp in quality chest discomfort radiating to the right shoulder/neck.  Denies associated nausea vomiting diaphoresis loss of consciousness.  Patient was ultimately brought into ER for evaluation by daughters due to persistent discomfort.  In ER, troponin T WNL, nonspecific ST changes on EKG.  Mildly elevated D-dimer, CTA PE was ordered by ERP.  Case was also discussed with on-call cardiology, who recommended obtaining stress test.  Admitted to medicine service.    I discussed the plan of care with patient, family and bedside RN.    Review of Systems  Review of Systems   Constitutional: Negative for chills, fever and malaise/fatigue.   HENT: Negative for hearing loss and tinnitus.    Eyes: Negative for blurred vision and double vision.   Respiratory: Negative for cough and shortness of breath.    Cardiovascular: Positive for chest pain. Negative for palpitations, orthopnea and leg swelling.   Gastrointestinal: Negative for abdominal pain, heartburn, nausea and vomiting.   Genitourinary: Negative for dysuria and flank pain.   Musculoskeletal: Positive for myalgias. Negative for joint pain.        Right shoulder pain   Skin: Negative for itching and rash.   Neurological: Negative for dizziness, speech  change, focal weakness and headaches.   Endo/Heme/Allergies: Negative for environmental allergies. Does not bruise/bleed easily.   Psychiatric/Behavioral: Negative for depression and substance abuse.   All other systems reviewed and are negative.      Past Medical History   has a past medical history of Cancer (HCC), Hypertension, and Psychiatric disorder.    Surgical History   has a past surgical history that includes erna by laparoscopy and other.     Family History  family history is not on file.   Family history reviewed with patient. There is no family history that is pertinent to the chief complaint.   Denies FH of CAD, MI    Social History   reports that she is a non-smoker but has been exposed to tobacco smoke. She does not have any smokeless tobacco history on file. She reports that she does not drink alcohol and does not use drugs.    Allergies  No Known Allergies    Medications  Prior to Admission Medications   Prescriptions Last Dose Informant Patient Reported? Taking?   amLODIPine (NORVASC) 10 MG Tab   Yes Yes   Sig: Take 10 mg by mouth every day.   amoxicillin (AMOXIL) 400 MG/5ML suspension   No No   Si ML TWICE A DAY X 10 DAYS.      Facility-Administered Medications: None       Physical Exam  Temp:  [36.7 °C (98 °F)-36.8 °C (98.3 °F)] 36.7 °C (98 °F)  Pulse:  [68-76] 74  Resp:  [17-26] 18  BP: (141-155)/(67-79) 155/72  SpO2:  [92 %-97 %] 92 %  Blood Pressure : (!) 155/72   Temperature: 36.7 °C (98 °F)   Pulse: 74   Respiration: 18   Pulse Oximetry: 92 %       Physical Exam  Vitals and nursing note reviewed.   Constitutional:       Appearance: Normal appearance.   HENT:      Head: Normocephalic and atraumatic.      Nose: Nose normal.      Mouth/Throat:      Mouth: Mucous membranes are moist.      Pharynx: Oropharynx is clear.   Eyes:      General: No scleral icterus.     Extraocular Movements: Extraocular movements intact.   Cardiovascular:      Rate and Rhythm: Normal rate and regular rhythm.       Pulses: Normal pulses.      Heart sounds:     No friction rub.      Comments: Reproducible chest wall tenderness  Pulmonary:      Effort: Pulmonary effort is normal. No respiratory distress.      Breath sounds: No stridor. No wheezing or rales.   Abdominal:      General: Bowel sounds are normal. There is no distension.      Palpations: Abdomen is soft.      Tenderness: There is no abdominal tenderness. There is no guarding or rebound.   Musculoskeletal:         General: No swelling or tenderness. Normal range of motion.      Cervical back: Neck supple. No tenderness.   Skin:     General: Skin is warm and dry.      Capillary Refill: Capillary refill takes less than 2 seconds.   Neurological:      General: No focal deficit present.      Mental Status: She is alert and oriented to person, place, and time.   Psychiatric:         Mood and Affect: Mood normal.         Laboratory:  Recent Labs     06/15/22  1344   WBC 7.1   RBC 4.65   HEMOGLOBIN 14.0   HEMATOCRIT 41.7   MCV 89.7   MCH 30.1   MCHC 33.6   RDW 43.5   PLATELETCT 238   MPV 10.2     Recent Labs     06/15/22  1344   SODIUM 140   POTASSIUM 3.9   CHLORIDE 106   CO2 24   GLUCOSE 102*   BUN 17   CREATININE 0.81   CALCIUM 9.4     Recent Labs     06/15/22  1344   ALTSGPT 25   ASTSGOT 21   ALKPHOSPHAT 94   TBILIRUBIN 0.4   GLUCOSE 102*         Recent Labs     06/15/22  1725   NTPROBNP 290*         Recent Labs     06/15/22  1344 06/15/22  1725   TROPONINT <6 <6       Imaging:  DX-CHEST-PORTABLE (1 VIEW)   Final Result      1.  There is no acute cardiopulmonary process.   2.  Hypoinflated exam similar to prior exam.      CT-CTA CHEST PULMONARY ARTERY W/ RECONS    (Results Pending)   EC-ECHOCARDIOGRAM COMPLETE W/O CONT    (Results Pending)       X-Ray:  I have personally reviewed the images and compared with prior images.  EKG:  I have personally reviewed the images and compared with prior images.    Assessment/Plan:  Justification for Admission Status  I anticipate this  patient is appropriate for observation status    * Chest pain- (present on admission)  Assessment & Plan  Probable MSK  R/o ACS  Trend CE, EKG  ASA/statin  PRN nitro SL, morphine  Follow-up echo, MPS      Elevated d-dimer  Assessment & Plan  Mild, doubt PE  CTA PE ordered by ERP -- pending    GERD (gastroesophageal reflux disease)  Assessment & Plan  PPI    Hypertension  Assessment & Plan  Continue amlodipine  Hold BB for MPS, resume when appropriate  PRN hydralazine    Class 1 obesity in adult  Assessment & Plan  Diet and lifestyle modification    ACP (advance care planning)  Assessment & Plan  Discussed with patient and patient's daughter in ER-full code per patient's wish          VTE prophylaxis: enoxaparin ppx

## 2022-06-16 NOTE — ED NOTES
Report called to ANDREI Jasso in [facility]. Patient to be transported down to [facility] after CT scan

## 2022-06-16 NOTE — CONSULTS
Cardiology Initial Consultation    Date of Service  6/16/2022    Referring Physician  Bertin Bell M.D.    Reason for Consultation  Chest pain    History of Presenting Illness  Alina Hampton is a 67 y.o.  Upper sorbian speaking female with a past medical history of angina, hypertension and GERD who presented 6/15/2022 with right-sided chest pain which radiated to her neck and back. On 6/13/2022, she awoke with sharp right-sided chest pain radiating to the right shoulder and neck. It was exacerbated with movement and tylenol only provided minimal relief. It continued to worsen over the next few days so she presented to the ED 6/15/2022. In the ED, she was found to be in Sinus rhythm with non-specific ST changes and a heart score of 4 so she was admitted to the CDU. An MPI was performed that demonstrated a small area of moderate reversibility in the inferior lateral base.     Today, pt is accompanied by her daughters who assisted with translation. Patient states her chest pain is improved with medication, but still present when she raises her arm. Denies any shortness of breath, diaphoresis, palpitations or light headedness. Pt is here visiting family from [facility]. She states that approximately 7 months ago she was diagnosed with COVID-19 and subsequently experienced chest pain. She was referred to cardiology for further work-up, but has only had an EKG and management for hypertension.     Review of Systems  Review of Systems   Constitutional: Negative.  Negative for diaphoresis, fatigue, fever and unexpected weight change.   HENT: Negative.  Negative for congestion, rhinorrhea and sore throat.    Eyes: Negative.    Respiratory: Negative.  Negative for apnea, cough, chest tightness and shortness of breath.    Cardiovascular: Positive for chest pain (sharp,right-sided that radiates to the back; improved as compared to prior. exacerbated with movement). Negative for palpitations and leg swelling.  "  Gastrointestinal: Negative.  Negative for abdominal pain, constipation, diarrhea and nausea.        Bloating   Endocrine: Negative.    Genitourinary: Negative.  Negative for difficulty urinating, frequency and urgency.   Musculoskeletal: Positive for arthralgias. Negative for joint swelling and myalgias.   Skin: Negative.    Allergic/Immunologic: Negative.    Neurological: Negative.  Negative for dizziness, weakness, light-headedness and numbness.   Hematological: Negative.    Psychiatric/Behavioral: Negative.        Past Medical History   has a past medical history of Cancer (HCC), Hypertension, and Psychiatric disorder.    Surgical History   has a past surgical history that includes erna by laparoscopy and other. Lipoma removal    Family History  Denies any family history of cancer, diabetes, heart disease or stroke.     Social History  No smoking history. Does not use smokeless tobacco. Drinks wine occasionally during social occasions. Denies any current or former drug use.     Medications  Prior to Admission Medications   Prescriptions Last Dose Informant Patient Reported? Taking?   Non Formulary Request 6/14/2022 at 1200 Family Member Yes Yes   Sig: Take 10 mg by mouth every day. \"RUPATADINE 10 MG\"   acetaminophen (TYLENOL) 500 MG Tab 6/14/2022 at 1200 Family Member Yes Yes   Sig: Take 1,000 mg by mouth every 6 hours as needed for Mild Pain. 2 tablets = 1,000 mg.   amLODIPine (NORVASC) 10 MG Tab 6/14/2022 at 1800 Family Member Yes Yes   Sig: Take 5 mg by mouth every evening. One-half tablet = 5 mg.   bisoprolol (ZEBETA) 5 MG Tab 6/15/2022 at 0900 Family Member Yes Yes   Sig: Take 5 mg by mouth every morning.   lansoprazole (PREVACID) 30 MG CAPSULE DELAYED RELEASE 6/15/2022 at 0900 Family Member Yes Yes   Sig: Take 30 mg by mouth every morning.      Facility-Administered Medications: None       Allergies  No Known Allergies    Vital signs in last 24 hours  Temp:  [36.2 °C (97.1 °F)-36.8 °C (98.3 °F)] 36.3 °C " (97.3 °F)  Pulse:  [66-79] 73  Resp:  [16-26] 16  BP: (113-155)/(55-79) 128/61  SpO2:  [87 %-97 %] 91 %    Physical Exam  Physical Exam  Constitutional:       General: She is not in acute distress.     Appearance: Normal appearance. She is not toxic-appearing or diaphoretic.   HENT:      Head: Normocephalic and atraumatic.      Mouth/Throat:      Mouth: Mucous membranes are moist.      Pharynx: Oropharynx is clear.   Eyes:      General: No scleral icterus.     Extraocular Movements: Extraocular movements intact.      Conjunctiva/sclera: Conjunctivae normal.      Pupils: Pupils are equal, round, and reactive to light.   Neck:      Comments: No JVD  Cardiovascular:      Rate and Rhythm: Normal rate and regular rhythm.      Pulses: Normal pulses.      Heart sounds: Normal heart sounds. No murmur heard.    No friction rub. No gallop.   Pulmonary:      Effort: Pulmonary effort is normal.      Breath sounds: Normal breath sounds.   Abdominal:      General: Abdomen is flat. Bowel sounds are normal.      Palpations: Abdomen is soft.      Tenderness: There is no abdominal tenderness.   Musculoskeletal:         General: Normal range of motion.      Cervical back: Normal range of motion and neck supple.      Right lower leg: No edema.      Left lower leg: No edema.   Skin:     General: Skin is warm and dry.      Capillary Refill: Capillary refill takes 2 to 3 seconds.      Coloration: Skin is not jaundiced.      Findings: No erythema.   Neurological:      General: No focal deficit present.      Mental Status: She is alert and oriented to person, place, and time. Mental status is at baseline.   Psychiatric:         Mood and Affect: Mood normal.         Behavior: Behavior normal.         Thought Content: Thought content normal.         Judgment: Judgment normal.         Lab Review  Lab Results   Component Value Date/Time    WBC 7.4 06/16/2022 04:53 AM    RBC 4.34 06/16/2022 04:53 AM    HEMOGLOBIN 13.1 06/16/2022 04:53 AM     HEMATOCRIT 39.6 06/16/2022 04:53 AM    MCV 91.2 06/16/2022 04:53 AM    MCH 30.2 06/16/2022 04:53 AM    MCHC 33.1 (L) 06/16/2022 04:53 AM    MPV 10.7 06/16/2022 04:53 AM      Lab Results   Component Value Date/Time    SODIUM 140 06/16/2022 04:53 AM    POTASSIUM 3.8 06/16/2022 04:53 AM    CHLORIDE 105 06/16/2022 04:53 AM    CO2 26 06/16/2022 04:53 AM    GLUCOSE 98 06/16/2022 04:53 AM    BUN 18 06/16/2022 04:53 AM    CREATININE 0.76 06/16/2022 04:53 AM      Lab Results   Component Value Date/Time    ASTSGOT 21 06/15/2022 01:44 PM    ALTSGPT 25 06/15/2022 01:44 PM     Lab Results   Component Value Date/Time    CHOLSTRLTOT 158 06/16/2022 04:53 AM    LDL 58 06/16/2022 04:53 AM    HDL 37 (A) 06/16/2022 04:53 AM    TRIGLYCERIDE 313 (H) 06/16/2022 04:53 AM    TROPONINT <6 06/16/2022 04:53 AM       Recent Labs     06/15/22  1725   NTPROBNP 290*       Cardiac Imaging and Procedures Review  EKG:  My personal interpretation of the EKG dated 6/16/2022 is Sinus Rhythm with non-specific ST changes, RBB and LAFB.     Echocardiogram: Pending    Cardiac Catheterization:  Pending    Imaging  Chest X-Ray:  6/15/2022    FINDINGS:  There are median sternotomy wires.  The mediastinal and cardiac silhouette is accentuated by the apical lordotic view.  The pulmonary vascularity is within normal limits.  Lungs are hypoinflated on this apical lordotic view.  There is no significant pleural effusion.  There is no visible pneumothorax.  There is degenerative change in the spine with a slight scoliosis.  There are surgical clips in the right upper abdomen.     IMPRESSION:  1.  There is no acute cardiopulmonary process.  2.  Hypoinflated exam similar to prior exam.     Stress Test:  6/16/2022    NUCLEAR IMAGING INTERPRETATION   Small area of moderate  reversibility in the inferior lateral base.   Normal left ventricular size, ejection fraction, and wall motion.    Assessment/Plan  1. Other Chest pain with abnormal MPI  - Etiology of chest pain is  still unclear in the setting of Heart score of 4 and mildly abnormal MPI.   -Will proceed with left heart cath and coronary angiogram today.  - Echo pending  - Continue Aspirin, atorvastatin, and amlodipine. Resume home dose of bisoprolol.     2. Hypertension  Continue Amlodipine 5mg QHS. Resume home dose of bisoprolol 5mg QHS    Thank you for allowing me to participate in the care of this patient.    Cardiology will continue to follow    Please contact me with any questions.    Please see Dr. Stratton's attestation for additional recommendations.    Brunilda De La Paz P.A.-C.   Cardiology, [facility]      I personally spent a total of 35 minutes which includes face-to-face time and non-face-to-face time spent on preparing to see the patient, reviewing hospital notes and tests, obtaining history from the patient, performing a medically appropriate exam, counseling and educating the patient, ordering medications/tests/procedures/referrals as clinically indicated, and documenting information in the electronic medical record.

## 2022-06-17 NOTE — DISCHARGE INSTRUCTIONS
Discharge Instructions    Discharged to home by car with relative. Discharged via wheelchair, hospital escort: Yes.  Special equipment needed: Not Applicable    Be sure to schedule a follow-up appointment with your primary care doctor or any specialists as instructed.     Discharge Plan:        I understand that a diet low in cholesterol, fat, and sodium is recommended for good health. Unless I have been given specific instructions below for another diet, I accept this instruction as my diet prescription.   Other diet: heart healthy    Special Instructions: None    Is patient discharged on Warfarin / Coumadin?   No     Depression / Suicide Risk    As you are discharged from this [facility] facility, it is important to learn how to keep safe from harming yourself.    Recognize the warning signs:  Abrupt changes in personality, positive or negative- including increase in energy   Giving away possessions  Change in eating patterns- significant weight changes-  positive or negative  Change in sleeping patterns- unable to sleep or sleeping all the time   Unwillingness or inability to communicate  Depression  Unusual sadness, discouragement and loneliness  Talk of wanting to die  Neglect of personal appearance   Rebelliousness- reckless behavior  Withdrawal from people/activities they love  Confusion- inability to concentrate     If you or a loved one observes any of these behaviors or has concerns about self-harm, here's what you can do:  Talk about it- your feelings and reasons for harming yourself  Remove any means that you might use to hurt yourself (examples: pills, rope, extension cords, firearm)  Get professional help from the community (Mental Health, Substance Abuse, psychological counseling)  Do not be alone:Call your Safe Contact- someone whom you trust who will be there for you.  Call your local CRISIS HOTLINE 292-1417 or 430-401-5897  Call your local Children's Mobile Crisis Response Team [facility]  [phone]) [phone] or www.App.io.com  Call the toll free National Suicide Prevention Hotlines   National Suicide Prevention Lifeline 668-848-TYXW (2380)  National [place] Line Network 800-SUICIDE (478-5122)    Cuidado del sitio del radio  Radial Site Care    Esta hoja le proporciona información sobre cómo cuidarse después del procedimiento. El médico también podrá darle instrucciones más específicas. Comuníquese con porras médico si tiene problemas o preguntas.  ¿Qué puedo esperar después del procedimiento?  Después del procedimiento, es común tener los siguientes síntomas:  Moretones y dolor a la palpación en el lugar de inserción del catéter.  Siga estas indicaciones en porras casa:  Medicamentos  [place] los medicamentos de venta heriberto y los recetados solamente ang se lo haya indicado el médico.  Cuidados de la kaleigh de la inserción  Siga las indicaciones del médico acerca de los cuidados del lugar de la inserción. Tera lo siguiente:  Lávese las bobby con agua y jabón antes de cambiar las vendas (vendajes). Use desinfectante para bobby si no dispone de agua y jabón.  Cambie los vendajes ang se lo haya indicado el médico.  No retire los puntos (suturas), la goma para cerrar la piel o las tiras adhesivas. Es posible que estos cierres cutáneos deban permanecer en la piel ildefonso 2 semanas o más. Si los bordes de las tiras adhesivas empiezan a despegarse y enroscarse, puede recortar los que están sueltos. No retire las tiras adhesivas por completo a menos que el médico se lo indique.  Controle el lugar de inserción todos los días para descartar signos de infección. Esté atento a los siguientes signos:  Dolor, hinchazón o enrojecimiento.  Líquido o daniel.  Pus o mal olor.  Calor.  No tome garfield de inmersión, no nade ni use el jacuzzi hasta que el médico lo autorice.  Puede ducharse entre 24 y 48 horas después del procedimiento o ang se lo haya indicado el médico.  Retire el vendaje y lave suavemente el lugar de la inserción con  agua y jabón común.  Seque christen el área con laura toalla limpia dando golpecitos.  No se frote el lugar. Al hacerlo, puede ocasionar sangrado.  No se aplique talcos ni lociones en el lugar.  Actividad    Ildefonso las 24 horas posteriores al procedimiento o según las indicaciones del médico:  No flexione ni doble el brazo afectado.  No empuje ni jale objetos pesados con el brazo afectado.  No conduzca a porras casa desde la clínica o el hospital. Puede conducir 24 horas después del procedimiento, a menos que el médico le haya indicado lo contrario.  No opere maquinaria ni herramientas eléctricas.  No levante ningún objeto que pese más de 10 libras (4,5 kg) o el límite de peso que le hayan indicado, hasta que el médico le diga que puede hacerlo.  Pregúntele al médico cuándo puede hacer lo siguiente:  Regresar a la escuela o al trabajo.  Reanudar las actividades físicas o los deportes que practica habitualmente.  Reanudar la actividad sexual.  Instrucciones generales  Si el lugar de la inserción del catéter comienza a sangrar, levante el brazo y ejerza presión en el lugar con firmeza. Si la hemorragia no se detiene, pida ayuda de inmediato. [place] es laura emergencia médica.  Si regresó a porras casa el mismo día que se realizó el procedimiento, un adulto responsable debe acompañarlo ildefonso las primeras 24 horas posteriores a la llegada a porras casa.  Concurra a todas las visitas de control ang se lo haya indicado el médico. [place] es importante.  Comuníquese con un médico si:  Tiene fiebre.  Tiene enrojecimiento, hinchazón o laura secreción amarillenta alrededor del lugar de la inserción.  Solicite ayuda de inmediato si:  Tiene un dolor que no es habitual en el sitio del radio.  La kaleigh de inserción del catéter se hincha muy rápido.  La kaleigh de inserción sangra y el sangrado no se detiene cuando ejerce presión celi en la kaleigh.  El brazo o la mano se le ponen pálidos, fríos o siente hormigueo o adormecimiento.  Estos síntomas pueden  representar un problema grave que constituye alura emergencia. No espere hasta que los síntomas desaparezcan. Solicite atención médica de inmediato. Comuníquese con el servicio de emergencias de porras localidad (911 en los [place]). No conduzca por nayely propios medios hasta el hospital.  Resumen  Después del procedimiento, es frecuente tener moretones y sentir dolor a la palpación en el lugar de inserción del catéter.  Siga las instrucciones del médico acerca del cuidado de la herida en el lugar de inserción radial. Observe la herida todos los días para descartar signos de infección.  No levante ningún objeto que pese más de 10 libras (4,5 kg) o el límite de peso que le hayan indicado, hasta que el médico le diga que puede hacerlo.  Esta información no tiene ang fin reemplazar el consejo del médico. Asegúrese de hacerle al médico cualquier pregunta que tenga.  Document Released: 04/13/2012 Document Revised: 02/25/2019 Document Reviewed: 02/25/2019  Elsevier Patient Education © 2020 [employer]

## 2022-06-17 NOTE — PROCEDURES
"CARDIAC CATHETERIZATION REPORT    REFERRING: Cezar Stratton MD    PROCEDURE PHYSICIAN: Jasper Phillip MD, [facility], [facility]  ASSISTANT: None    IMPRESSIONS:  1.  False positive stress test  2.  Nonobstructive three-vessel coronary disease   3.  Normal LVEDP    Recommendations:  Usual post cath care  Further recommendations per the rounding team    Pre-procedure diagnosis: Abnormal stress test  Post-procedure diagnosis: False positive stress test    Procedure performed  Selective coronary angiography  Left heart catheterization    Conscious sedation was supervised by myself and administered by trained personnel using fentanyl and versed between 1709 and 1719. The patient tolerated sedation without complication.     Procedure Description  1. Access: 5/6 American right radial artery Micropuncture technique was utilized following local anesthesia with lidocaine.  A radial cocktail containing verapamil and saline was administered in the radial artery sheath    2. Diagnostic description: The catheter was passed to the central circulation with the aide of J tipped 0.35\" wire. A 5F TIG 4.0 and 6F JR4 were used to inject the coronary circulationand enter the left ventricle during invasive hemodynamic monitoring.     3. Closing: At completion of the procedure the relevant equipment was removed from the body and hemostasis achieved by Radial band    Findings   Hemodynamics:   Aorta: 154/73 mmHg  LVEDP: 9 mmHg  No significant pullback gradient across the aortic valve    Coronary Anatomy   Left Main: Normal   LAD: 30% mid vessel stenosis   LCx: Minimal luminal irregularities   RCA: Dominant, 30% proximal stenosis     Technical Factors  1. Complications: None  2. Estimated Blood Loss: <50 cc  3. Specimens: None  4. Contrast Volume: 25 ml  5. Medications: Radial cocktail (Verapamil 2.5 mg, Nitroglycerin 100 mcg) Heparin 5000 Units  6. Radiation (air kerma): 92 mGy      "

## 2022-06-17 NOTE — DISCHARGE SUMMARY
Discharge Summary    CHIEF COMPLAINT ON ADMISSION  Chief Complaint   Patient presents with   • Shoulder Pain     Right shoulder pain   • Chest Pain     Right lung        Reason for Admission  Sent by      Admission Date  6/15/2022    CODE STATUS  Prior    HPI & HOSPITAL COURSE  As per dr gerson COTA Mckay Hampton is a 67 y.o. Russian-speaking female with history of hypertension, GERD who presented 6/15/2022 with right-sided chest pain and shoulder pain.  History obtained from patient's 2 daughters who are at bedside in ER as patient is Russian-speaking only.  Per daughter, patient is visiting from [facility].  On Monday 6/13, she reported having right-sided, sharp in quality chest discomfort radiating to the right shoulder/neck.  Denies associated nausea vomiting diaphoresis loss of consciousness.  Patient was ultimately brought into ER for evaluation by daughters due to persistent discomfort.  In ER, troponin T WNL, nonspecific ST changes on EKG.  Mildly elevated D-dimer, CTA PE was ordered by ERP.  Case was also discussed with on-call cardiology, who recommended obtaining stress test.  Admitted to medicine service.      Patient underwent a cardiac stress test that showed evidence of mild reverse ischemia.  Cardiology was consulted and patient taken to the Cath Lab for a left heart angiogram.  Based on the results of the angiogram the cardiologist felt that the stress test was a false positive and cleared the patient home    Patient was also complaining of right shoulder pain.  On exam there is no focal deficits or bruising or swelling or redness that could be appreciated.  The right shoulder pain most likely related to muscle skeletal pain and patient to take Tylenol or Motrin for pain control        Therefore, she is discharged in good and stable condition to home with close outpatient follow-up.    The patient recovered much more quickly than anticipated on admission.    Discharge  "Date  6/16/2022    FOLLOW UP ITEMS POST DISCHARGE    DISCHARGE DIAGNOSES  Principal Problem (Resolved):    Chest pain POA: Yes  Active Problems:    ACP (advance care planning) POA: Yes    Hypertension POA: Yes    GERD (gastroesophageal reflux disease) POA: Yes    Class 1 obesity in adult POA: Yes  Resolved Problems:    Elevated d-dimer POA: Yes      FOLLOW UP  No future appointments.  [facility]IMARY [facility] CENTER [facility]  [address]  [place] [place] [address]  [phone]  Go in 1 week(s)  As needed, If symptoms worsen      MEDICATIONS ON DISCHARGE     Medication List      START taking these medications      Instructions   aspirin 81 MG EC tablet   Take 1 Tablet by mouth every day.  Dose: 81 mg     atorvastatin 40 MG Tabs  Commonly known as: LIPITOR   Take 1 Tablet by mouth every evening.  Dose: 40 mg        CONTINUE taking these medications      Instructions   acetaminophen 500 MG Tabs  Commonly known as: TYLENOL   Take 1,000 mg by mouth every 6 hours as needed for Mild Pain. 2 tablets = 1,000 mg.  Dose: 1,000 mg     amLODIPine 10 MG Tabs  Commonly known as: NORVASC   Take 5 mg by mouth every evening. One-half tablet = 5 mg.  Dose: 5 mg     bisoprolol 5 MG Tabs  Commonly known as: ZEBETA   Take 5 mg by mouth every morning.  Dose: 5 mg     lansoprazole 30 MG Cpdr  Commonly known as: PREVACID   Take 30 mg by mouth every morning.  Dose: 30 mg     Non Formulary Request   Take 10 mg by mouth every day. \"RUPATADINE 10 MG\"  Dose: 10 mg            Allergies  No Known Allergies    DIET  No orders of the defined types were placed in this encounter.      ACTIVITY  As tolerated.  Weight bearing as tolerated    CONSULTATIONS  Dr avery cards    PROCEDURES  NM-CARDIAC STRESS TEST  Order: [id number]   Status: Edited Result - FINAL     Visible to patient: No (inaccessible in [employer])     Next appt: None     0 Result Notes    Details    Reading Physician Reading Date Result Priority   No Reading Provider Prelim 6/16/2022  "   Willard Ortiz M.D.  [phone] 2022    Ct Guaman M.D.  [phone] 2022      Narrative & Impression                           Myocardial Perfusion   Report   NUCLEAR IMAGING INTERPRETATION   Small area of moderate  reversibility in the inferior lateral base.   Normal left ventricular size, ejection fraction, and wall motion.   Negative stress ECG for ischemia.    ECG INTERPRETATION   Negative stress ECG for ischemia.      KATIUSKA MARTINO      MRN:    [id number]         Gender:    F      Exam Date: 2022 06:25      Exam Location:      Inpatient      Ordering Phys:     SABA GARRIDO      LAM Aviation[employer] Tech:       Nishant Krishna RT                       (R,N)      Age:    67    :    1955        Ht (in):     65      Wt (lb):     184    BMI:    30.71       Radiologist      Risk Factors:             None      Indications:              Other chest pain      ICD Codes:                R07.89      Cardiac History:          Dyspnea on exertion, Palpitations      Cardiac Meds:      Meds Past 24 hrs:      Pretest Chest Pain:       No symptoms      STRESS TEST      Pharmacologi                    keren Najera   Lexiscan       Dose: 0.4 mg   ol:                                 Post-Injection Exercise:        No exercise followed the intravenous   injection                     Resting HR (bpm):      76      Peak HR (bpm):         111      Resting BP (mmHg):       119    /   55      Peak BP (mmHg):       131   /   60      MaxPHR:     152     Target HR (bpm):       129      % MaxPHR:     73      Double Product:       49106      BP Response:      Stress Termination:       Completion of Protocol      Stress Symptoms:   FEELING WARM      ECG      Resting ECG:     Sinus rhythm with right bundle branch block.      Stress ECG:      No ischemic changes with Regadenoson.      IMAGE PROTOCOL      Rest/Stress 1                        Day              RadiopharmaceuticalDose (mCi)   Imaging  Date       Imaging  Time           Inj to Img Time (min)   Rest:   Tc-99m             8.1          16-Jun-2022        08:33                   30           Tetrofosmin   Stress: Tc-99m             30.7         16-Jun-2022        09:31                   30           Tetrofosmin      Rest:   Administration Site:       Left antecubital                               fossa   Administered by:      Vikki Garces NMCYNTHIA      Stress:   Administration Site:       Left antecubital                               fossa   Administered by:      Vikki Garces NMT      % Percent HR Achieved:   SPECT RESULTS      Technical Quality:       Good      Raw Data Analysis:   Summed Stress Score:    6   Summed Rest Score:    2   Summed Difference Score:        5   PERFUSION:   Small area of moderate  reversibility in the inferior lateral base.      FUNCTIONAL RESULTS (calculated via Gated SPECT)      Stress Image LV EF:        80     %      Upper Normal Limit      Stress EDV:      55     ml   EDVI:    29      ml/m2      Stress ESV:      11     ml   ESVI:    6       ml/m2      TID:    0.97   TID - 1.19      TID (ed) - 1.23   LV Function:   Normal left ventricular wall motion.  LV ejection fraction = 80%.                           Willard Ortiz MD   Edited by: Ct CORTEZ To   (Electronically Signed)   Final Date:      16 June 2022                     10:30     ===================================    Date of Service:  6/16/2022  5:21 PM                         []Hide copied text    []Hover for details    CARDIAC CATHETERIZATION REPORT     REFERRING: Cezar Stratton MD     PROCEDURE PHYSICIAN: Jasper Phillip MD, [facility], [facility]  ASSISTANT: None     IMPRESSIONS:  1.  False positive stress test  2.  Nonobstructive three-vessel coronary disease   3.  Normal LVEDP     Recommendations:  Usual post cath care  Further recommendations per the rounding team     Pre-procedure diagnosis: Abnormal stress test  Post-procedure diagnosis: False positive stress  "test     Procedure performed  Selective coronary angiography  Left heart catheterization     Conscious sedation was supervised by myself and administered by trained personnel using fentanyl and versed between 1709 and 1719. The patient tolerated sedation without complication.      Procedure Description  1. Access: 5/6 Czech right radial artery Micropuncture technique was utilized following local anesthesia with lidocaine.  A radial cocktail containing verapamil and saline was administered in the radial artery sheath     2. Diagnostic description: The catheter was passed to the central circulation with the aide of J tipped 0.35\" wire. A 5F TIG 4.0 and 6F JR4 were used to inject the coronary circulationand enter the left ventricle during invasive hemodynamic monitoring.      3. Closing: At completion of the procedure the relevant equipment was removed from the body and hemostasis achieved by Radial band     Findings   Hemodynamics:   Aorta: 154/73 mmHg  LVEDP: 9 mmHg  No significant pullback gradient across the aortic valve     Coronary Anatomy              Left Main: Normal              LAD: 30% mid vessel stenosis              LCx: Minimal luminal irregularities              RCA: Dominant, 30% proximal stenosis                Technical Factors  1. Complications: None  2. Estimated Blood Loss: <50 cc  3. Specimens: None  4. Contrast Volume: 25 ml  5. Medications: Radial cocktail (Verapamil 2.5 mg, Nitroglycerin 100 mcg) Heparin 5000 Units  6. Radiation (air kerma): 92 mGy               Show images for CT-CTA CHEST PULMONARY ARTERY W/ RECONS    CT-CTA CHEST PULMONARY ARTERY W/ RECONS  Order: [id number]   Status: Final result       Visible to patient: No (inaccessible in MyChart)       Next appt: None       0 Result Notes      Details    Reading Physician Reading Date Result Priority   Duane Milligan M.D.  [phone] 6/15/2022 STAT     Narrative & Impression     6/15/2022 9:16 PM     HISTORY/REASON FOR EXAM:  PE " suspected, high prob  Shortness of breath, chest pain, history of malignancy     TECHNIQUE/EXAM DESCRIPTION:  CT angiogram scan for pulmonary embolism with contrast, with reconstructions.     1.25 mm helical sections were obtained from the lung apices through the lung bases following the rapid bolus administration of 40 mL of Omnipaque 350 nonionic contrast. Thin-section overlapping reconstruction interval was utilized. Coronal   reconstructions were generated from the axial data. MIP post processing was performed and utilized for the interpretation.     Low dose optimization technique was utilized for this CT exam including automated exposure control and adjustment of the mA and/or kV according to patient size.     COMPARISON: None     FINDINGS:  Neck Base:  Normal.     Lungs/Pleura: Mild subsegmental opacities in the left upper and lower lobes probably atelectasis/scarring. There is subtle tiny nodular groundglass opacities in the right lower lobe which are indeterminate probably infectious or chronic postinflammatory.   No significant consolidation or mass. No pneumothorax or pleural effusion.     Cardiovascular Structures: The pulmonary arteries are adequately opacified through the subsegmental levels. No intraluminal filling defect is identified to suggest pulmonary embolus. Central pulmonary arteries are normal in caliber. Heart size is normal.   No pericardial effusion. There is atherosclerosis of the coronary arteries and aorta. Aorta is normal in caliber without aneurysm or dissection.     Mediastinum/ lymph nodes: No lymphadenopathy.     Upper Abdomen:  Cholecystectomy.     Soft tissues/wall: Within normal limits.     Bones:  No acute or aggressive abnormality.     IMPRESSION:        1.  No evidence of pulmonary embolism.  2.  Atherosclerosis.     Fleischner Society pulmonary nodule recommendations:  Nonapplicable               Exam Ended: 06/15/22  9:30 PM                   LABORATORY  Lab Results    Component Value Date    SODIUM 140 06/16/2022    POTASSIUM 3.8 06/16/2022    CHLORIDE 105 06/16/2022    CO2 26 06/16/2022    GLUCOSE 98 06/16/2022    BUN 18 06/16/2022    CREATININE 0.76 06/16/2022        Lab Results   Component Value Date    WBC 7.4 06/16/2022    HEMOGLOBIN 13.1 06/16/2022    HEMATOCRIT 39.6 06/16/2022    PLATELETCT 213 06/16/2022        Total time of the discharge process exceeds 37  minutes.

## 2022-08-18 ENCOUNTER — OFFICE VISIT (OUTPATIENT)
Dept: URGENT CARE | Facility: PHYSICIAN GROUP | Age: 67
End: 2022-08-18
Payer: MEDICARE

## 2022-08-18 VITALS
HEIGHT: 65 IN | WEIGHT: 195 LBS | BODY MASS INDEX: 32.49 KG/M2 | SYSTOLIC BLOOD PRESSURE: 126 MMHG | RESPIRATION RATE: 18 BRPM | DIASTOLIC BLOOD PRESSURE: 74 MMHG | OXYGEN SATURATION: 98 % | HEART RATE: 83 BPM | TEMPERATURE: 98.2 F

## 2022-08-18 DIAGNOSIS — R07.9 CHEST PAIN, UNSPECIFIED TYPE: ICD-10-CM

## 2022-08-18 DIAGNOSIS — M79.662 PAIN OF LEFT CALF: ICD-10-CM

## 2022-08-18 PROCEDURE — 99205 OFFICE O/P NEW HI 60 MIN: CPT | Performed by: STUDENT IN AN ORGANIZED HEALTH CARE EDUCATION/TRAINING PROGRAM

## 2022-08-18 PROCEDURE — 93000 ELECTROCARDIOGRAM COMPLETE: CPT | Performed by: STUDENT IN AN ORGANIZED HEALTH CARE EDUCATION/TRAINING PROGRAM

## 2022-08-18 ASSESSMENT — FIBROSIS 4 INDEX: FIB4 SCORE: 1.34

## 2022-08-18 NOTE — PROGRESS NOTES
Subjective:   CHIEF COMPLAINT  Chief Complaint   Patient presents with    Leg Swelling     Left leg swelling x 1 week       HPI  Patient does not speak English and she is accompanied by her sister who translated the conversation.    Alina Hampton is a 67 y.o. female who presents with a chief complaint pain and swelling of her left lower extremity.  She points to the medial margins of the left lower extremity, just proximal to the medial malleolus, a source of discomfort that radiates posteriorly towards her calf.  Symptoms are constant and worse with walking and weightbearing.  Symptoms improve with elevation.  Positive review systems include chest pain, shortness of breath and cough.  Cough has been somewhat chronic after genevieve COVID.  No hemoptysis.  Patient was admitted to the hospital on 6/15/2022 for chest pain rule out; left heart cath was performed which was unremarkable.  No additional history of recent surgery or immobilization. Denies history of cancer/chemotherapy.  No personal or family history of blood clots.    REVIEW OF SYSTEMS  General: no fever or chills  GI: no nausea or vomiting  See HPI for further details.    PAST MEDICAL HISTORY  Patient Active Problem List    Diagnosis Date Noted    ACP (advance care planning) 06/15/2022    Hypertension 06/15/2022    GERD (gastroesophageal reflux disease) 06/15/2022    Class 1 obesity in adult 06/15/2022       SURGICAL HISTORY   has a past surgical history that includes erna by laparoscopy and other.    ALLERGIES  No Known Allergies    CURRENT MEDICATIONS  Home Medications       Reviewed by Giorgio Santos Ass't (Medical Assistant) on 08/18/22 at 1415  Med List Status: <None>     Medication Last Dose Status   acetaminophen (TYLENOL) 500 MG Tab Taking Active   amLODIPine (NORVASC) 10 MG Tab Taking Active   aspirin EC 81 MG EC tablet Taking Active   atorvastatin (LIPITOR) 40 MG Tab Taking Active   bisoprolol (ZEBETA) 5 MG Tab Taking Active  "  lansoprazole (PREVACID) 30 MG CAPSULE DELAYED RELEASE Taking Active   Non Formulary Request Taking Active                    SOCIAL HISTORY  Social History     Tobacco Use    Smoking status: Never     Passive exposure: Yes    Smokeless tobacco: Never   Vaping Use    Vaping Use: Never used   Substance and Sexual Activity    Alcohol use: No    Drug use: No    Sexual activity: Not on file       FAMILY HISTORY  No family history on file.       Objective:   PHYSICAL EXAM  VITAL SIGNS: /74   Pulse 83   Temp 36.8 °C (98.2 °F)   Resp 18   Ht 1.651 m (5' 5\")   Wt 88.5 kg (195 lb)   SpO2 98%   BMI 32.45 kg/m²     Gen: no acute distress, normal voice  Skin: dry, intact, moist mucosal membranes  Lungs: CTAB w/ symmetric expansion  CV: RRR w/o murmurs or clicks.  Nonpitting edema of left lower extremity extremity from the calf to the ankle.  No erythema, edema or warmth of the calf, but there was tenderness to palpation.  Psych: normal affect, normal judgement, alert, awake    EC2022  57 bpm.  Sinus rhythm.  Left axis deviation, left anterior fascicular block, right bundle branch block.  Nonspecific P wave abnormalities  Inverted T waves leads III and aVF  No ST segment elevation or depression.  No change from EKGs on 6/15/2022 and 2022.    Assessment/Plan:     1. Chest pain, unspecified type  EKG - Clinic Performed      2. Pain of left calf        67-year-old female here with left lower extremity edema, calf pain associated with chest pain.  No acute ischemic changes on EKG.  Differentials are broad and concern for VTE.  Patient needs a higher level of evaluation than what can be reasonably performed in urgent care setting at this time, therefore instructed go to the emergency room.  Sister (who translated conversation) agreed, as did the patient, and will go to the ED after leaving urgent care.  All questions were answered.    Differential diagnosis, natural history, supportive care, and indications " for immediate follow-up discussed. All questions answered. Patient agrees with the plan of care.    Follow-up as needed if symptoms worsen or fail to improve to PCP, Urgent care or Emergency Room.    Please note that this dictation was created using voice recognition software. I have made a reasonable attempt to correct obvious errors, but I expect that there are errors of grammar and possibly content that I did not discover before finalizing the note.

## 2022-11-03 ENCOUNTER — PATIENT MESSAGE (OUTPATIENT)
Dept: HEALTH INFORMATION MANAGEMENT | Facility: OTHER | Age: 67
End: 2022-11-03